# Patient Record
Sex: FEMALE | URBAN - METROPOLITAN AREA
[De-identification: names, ages, dates, MRNs, and addresses within clinical notes are randomized per-mention and may not be internally consistent; named-entity substitution may affect disease eponyms.]

---

## 2024-04-07 ENCOUNTER — HOSPITAL ENCOUNTER (OUTPATIENT)
Facility: HOSPITAL | Age: 39
Setting detail: OBSERVATION
Discharge: LEFT BEFORE DISCHARGE | End: 2024-04-08
Attending: STUDENT IN AN ORGANIZED HEALTH CARE EDUCATION/TRAINING PROGRAM | Admitting: STUDENT IN AN ORGANIZED HEALTH CARE EDUCATION/TRAINING PROGRAM
Payer: COMMERCIAL

## 2024-04-07 ENCOUNTER — HOSPITAL ENCOUNTER (OUTPATIENT)
Facility: HOSPITAL | Age: 39
Setting detail: OBSERVATION
Discharge: LEFT BEFORE DISCHARGE | End: 2024-04-07
Attending: STUDENT IN AN ORGANIZED HEALTH CARE EDUCATION/TRAINING PROGRAM | Admitting: STUDENT IN AN ORGANIZED HEALTH CARE EDUCATION/TRAINING PROGRAM
Payer: COMMERCIAL

## 2024-04-07 ENCOUNTER — ANESTHESIA (OUTPATIENT)
Dept: OBSTETRICS AND GYNECOLOGY | Facility: HOSPITAL | Age: 39
End: 2024-04-07
Payer: COMMERCIAL

## 2024-04-07 ENCOUNTER — ANESTHESIA EVENT (OUTPATIENT)
Dept: OBSTETRICS AND GYNECOLOGY | Facility: HOSPITAL | Age: 39
End: 2024-04-07
Payer: COMMERCIAL

## 2024-04-07 ENCOUNTER — HOSPITAL ENCOUNTER (EMERGENCY)
Facility: HOSPITAL | Age: 39
Discharge: HOME | End: 2024-04-07
Attending: STUDENT IN AN ORGANIZED HEALTH CARE EDUCATION/TRAINING PROGRAM
Payer: COMMERCIAL

## 2024-04-07 VITALS
DIASTOLIC BLOOD PRESSURE: 66 MMHG | RESPIRATION RATE: 19 BRPM | OXYGEN SATURATION: 100 % | BODY MASS INDEX: 19.99 KG/M2 | HEART RATE: 82 BPM | SYSTOLIC BLOOD PRESSURE: 102 MMHG | WEIGHT: 120 LBS | HEIGHT: 65 IN | TEMPERATURE: 98.6 F

## 2024-04-07 VITALS
OXYGEN SATURATION: 97 % | SYSTOLIC BLOOD PRESSURE: 113 MMHG | RESPIRATION RATE: 18 BRPM | DIASTOLIC BLOOD PRESSURE: 66 MMHG | HEART RATE: 92 BPM | TEMPERATURE: 98.2 F

## 2024-04-07 DIAGNOSIS — O03.9 MISCARRIAGE (HHS-HCC): Primary | ICD-10-CM

## 2024-04-07 PROBLEM — O36.4XX0: Status: ACTIVE | Noted: 2024-04-07

## 2024-04-07 LAB
ABO GROUP (TYPE) IN BLOOD: NORMAL
ABO GROUP (TYPE) IN BLOOD: NORMAL
ALBUMIN SERPL-MCNC: 2.8 G/DL (ref 3.5–5)
ALP BLD-CCNC: 83 U/L (ref 35–125)
ALT SERPL-CCNC: 5 U/L (ref 5–40)
AMPHETAMINES UR QL SCN>1000 NG/ML: POSITIVE
ANION GAP SERPL CALC-SCNC: 8 MMOL/L
ANTIBODY SCREEN: NORMAL
AST SERPL-CCNC: 14 U/L (ref 5–40)
BARBITURATES UR QL SCN>300 NG/ML: NEGATIVE
BENZODIAZ UR QL SCN>300 NG/ML: NEGATIVE
BILIRUB SERPL-MCNC: 0.2 MG/DL (ref 0.1–1.2)
BUN SERPL-MCNC: 7 MG/DL (ref 8–25)
BZE UR QL SCN>300 NG/ML: NEGATIVE
CALCIUM SERPL-MCNC: 8.5 MG/DL (ref 8.5–10.4)
CANNABINOIDS UR QL SCN>50 NG/ML: POSITIVE
CHLORIDE SERPL-SCNC: 107 MMOL/L (ref 97–107)
CO2 SERPL-SCNC: 22 MMOL/L (ref 24–31)
CREAT SERPL-MCNC: 0.4 MG/DL (ref 0.4–1.6)
EGFRCR SERPLBLD CKD-EPI 2021: >90 ML/MIN/1.73M*2
ERYTHROCYTE [DISTWIDTH] IN BLOOD BY AUTOMATED COUNT: 12.4 % (ref 11.5–14.5)
FENTANYL+NORFENTANYL UR QL SCN: NEGATIVE
GLUCOSE SERPL-MCNC: 83 MG/DL (ref 65–99)
HCT VFR BLD AUTO: 35.5 % (ref 36–46)
HGB BLD-MCNC: 11.8 G/DL (ref 12–16)
HIV 1+2 AB+HIV1P24 AG SERPLBLD IA.RAPID: NONREACTIVE
MCH RBC QN AUTO: 29.6 PG (ref 26–34)
MCHC RBC AUTO-ENTMCNC: 33.2 G/DL (ref 32–36)
MCV RBC AUTO: 89 FL (ref 80–100)
METHADONE UR QL SCN>300 NG/ML: NEGATIVE
NRBC BLD-RTO: 0 /100 WBCS (ref 0–0)
OPIATES UR QL SCN>300 NG/ML: NEGATIVE
OXYCODONE UR QL: NEGATIVE
PCP UR QL SCN>25 NG/ML: NEGATIVE
PLATELET # BLD AUTO: 332 X10*3/UL (ref 150–450)
POTASSIUM SERPL-SCNC: 4 MMOL/L (ref 3.4–5.1)
PROT SERPL-MCNC: 5.5 G/DL (ref 5.9–7.9)
RBC # BLD AUTO: 3.99 X10*6/UL (ref 4–5.2)
RH FACTOR (ANTIGEN D): NORMAL
RH FACTOR (ANTIGEN D): NORMAL
SODIUM SERPL-SCNC: 137 MMOL/L (ref 133–145)
WBC # BLD AUTO: 18.1 X10*3/UL (ref 4.4–11.3)

## 2024-04-07 PROCEDURE — 3700000018 HC OB ANESTHESIA C-SECTION: Performed by: STUDENT IN AN ORGANIZED HEALTH CARE EDUCATION/TRAINING PROGRAM

## 2024-04-07 PROCEDURE — 80053 COMPREHEN METABOLIC PANEL: CPT | Performed by: STUDENT IN AN ORGANIZED HEALTH CARE EDUCATION/TRAINING PROGRAM

## 2024-04-07 PROCEDURE — 99285 EMERGENCY DEPT VISIT HI MDM: CPT

## 2024-04-07 PROCEDURE — 2500000004 HC RX 250 GENERAL PHARMACY W/ HCPCS (ALT 636 FOR OP/ED): Performed by: NURSE ANESTHETIST, CERTIFIED REGISTERED

## 2024-04-07 PROCEDURE — 2500000002 HC RX 250 W HCPCS SELF ADMINISTERED DRUGS (ALT 637 FOR MEDICARE OP, ALT 636 FOR OP/ED): Performed by: STUDENT IN AN ORGANIZED HEALTH CARE EDUCATION/TRAINING PROGRAM

## 2024-04-07 PROCEDURE — 2500000004 HC RX 250 GENERAL PHARMACY W/ HCPCS (ALT 636 FOR OP/ED): Performed by: STUDENT IN AN ORGANIZED HEALTH CARE EDUCATION/TRAINING PROGRAM

## 2024-04-07 PROCEDURE — G0378 HOSPITAL OBSERVATION PER HR: HCPCS

## 2024-04-07 PROCEDURE — 88307 TISSUE EXAM BY PATHOLOGIST: CPT | Performed by: PATHOLOGY

## 2024-04-07 PROCEDURE — 2500000005 HC RX 250 GENERAL PHARMACY W/O HCPCS: Performed by: NURSE ANESTHETIST, CERTIFIED REGISTERED

## 2024-04-07 PROCEDURE — 1220000001 HC OB SEMI-PRIVATE ROOM DAILY

## 2024-04-07 PROCEDURE — 3700000014 HC AN EPIDURAL BLOCK CHARGE: Performed by: STUDENT IN AN ORGANIZED HEALTH CARE EDUCATION/TRAINING PROGRAM

## 2024-04-07 PROCEDURE — 87340 HEPATITIS B SURFACE AG IA: CPT | Mod: TRILAB,WESLAB | Performed by: STUDENT IN AN ORGANIZED HEALTH CARE EDUCATION/TRAINING PROGRAM

## 2024-04-07 PROCEDURE — S4991 NICOTINE PATCH NONLEGEND: HCPCS | Performed by: STUDENT IN AN ORGANIZED HEALTH CARE EDUCATION/TRAINING PROGRAM

## 2024-04-07 PROCEDURE — 57410 PELVIC EXAMINATION: CPT

## 2024-04-07 PROCEDURE — 7100000016 HC LABOR RECOVERY PER HOUR: Performed by: STUDENT IN AN ORGANIZED HEALTH CARE EDUCATION/TRAINING PROGRAM

## 2024-04-07 PROCEDURE — 85027 COMPLETE CBC AUTOMATED: CPT | Performed by: STUDENT IN AN ORGANIZED HEALTH CARE EDUCATION/TRAINING PROGRAM

## 2024-04-07 PROCEDURE — 86780 TREPONEMA PALLIDUM: CPT | Mod: TRILAB,WESLAB | Performed by: STUDENT IN AN ORGANIZED HEALTH CARE EDUCATION/TRAINING PROGRAM

## 2024-04-07 PROCEDURE — 36415 COLL VENOUS BLD VENIPUNCTURE: CPT | Performed by: STUDENT IN AN ORGANIZED HEALTH CARE EDUCATION/TRAINING PROGRAM

## 2024-04-07 PROCEDURE — 2500000001 HC RX 250 WO HCPCS SELF ADMINISTERED DRUGS (ALT 637 FOR MEDICARE OP): Performed by: STUDENT IN AN ORGANIZED HEALTH CARE EDUCATION/TRAINING PROGRAM

## 2024-04-07 PROCEDURE — 86901 BLOOD TYPING SEROLOGIC RH(D): CPT | Performed by: STUDENT IN AN ORGANIZED HEALTH CARE EDUCATION/TRAINING PROGRAM

## 2024-04-07 PROCEDURE — 86803 HEPATITIS C AB TEST: CPT | Mod: TRILAB,WESLAB | Performed by: STUDENT IN AN ORGANIZED HEALTH CARE EDUCATION/TRAINING PROGRAM

## 2024-04-07 PROCEDURE — 86703 HIV-1/HIV-2 1 RESULT ANTBDY: CPT | Performed by: STUDENT IN AN ORGANIZED HEALTH CARE EDUCATION/TRAINING PROGRAM

## 2024-04-07 PROCEDURE — 80307 DRUG TEST PRSMV CHEM ANLYZR: CPT | Performed by: STUDENT IN AN ORGANIZED HEALTH CARE EDUCATION/TRAINING PROGRAM

## 2024-04-07 PROCEDURE — 59409 OBSTETRICAL CARE: CPT

## 2024-04-07 PROCEDURE — 88307 TISSUE EXAM BY PATHOLOGIST: CPT | Mod: TC,TRILAB,WESLAB | Performed by: STUDENT IN AN ORGANIZED HEALTH CARE EDUCATION/TRAINING PROGRAM

## 2024-04-07 PROCEDURE — 51701 INSERT BLADDER CATHETER: CPT

## 2024-04-07 RX ORDER — ONDANSETRON HYDROCHLORIDE 2 MG/ML
4 INJECTION, SOLUTION INTRAVENOUS EVERY 6 HOURS PRN
Status: DISCONTINUED | OUTPATIENT
Start: 2024-04-07 | End: 2024-04-07 | Stop reason: HOSPADM

## 2024-04-07 RX ORDER — TRANEXAMIC ACID 100 MG/ML
1000 INJECTION, SOLUTION INTRAVENOUS ONCE AS NEEDED
Status: DISCONTINUED | OUTPATIENT
Start: 2024-04-07 | End: 2024-04-08

## 2024-04-07 RX ORDER — ETOMIDATE 2 MG/ML
INJECTION INTRAVENOUS AS NEEDED
Status: DISCONTINUED | OUTPATIENT
Start: 2024-04-07 | End: 2024-04-07

## 2024-04-07 RX ORDER — METOCLOPRAMIDE HYDROCHLORIDE 5 MG/ML
10 INJECTION INTRAMUSCULAR; INTRAVENOUS EVERY 6 HOURS PRN
Status: DISCONTINUED | OUTPATIENT
Start: 2024-04-07 | End: 2024-04-07

## 2024-04-07 RX ORDER — ONDANSETRON 4 MG/1
4 TABLET, FILM COATED ORAL EVERY 6 HOURS PRN
Status: DISCONTINUED | OUTPATIENT
Start: 2024-04-07 | End: 2024-04-08

## 2024-04-07 RX ORDER — CARBOPROST TROMETHAMINE 250 UG/ML
250 INJECTION, SOLUTION INTRAMUSCULAR ONCE AS NEEDED
Status: DISCONTINUED | OUTPATIENT
Start: 2024-04-07 | End: 2024-04-07 | Stop reason: HOSPADM

## 2024-04-07 RX ORDER — MIDAZOLAM HYDROCHLORIDE 1 MG/ML
INJECTION, SOLUTION INTRAMUSCULAR; INTRAVENOUS AS NEEDED
Status: DISCONTINUED | OUTPATIENT
Start: 2024-04-07 | End: 2024-04-07

## 2024-04-07 RX ORDER — OXYTOCIN 10 [USP'U]/ML
10 INJECTION, SOLUTION INTRAMUSCULAR; INTRAVENOUS ONCE AS NEEDED
Status: DISCONTINUED | OUTPATIENT
Start: 2024-04-07 | End: 2024-04-07 | Stop reason: HOSPADM

## 2024-04-07 RX ORDER — SIMETHICONE 80 MG
80 TABLET,CHEWABLE ORAL 4 TIMES DAILY PRN
Status: DISCONTINUED | OUTPATIENT
Start: 2024-04-07 | End: 2024-04-07 | Stop reason: HOSPADM

## 2024-04-07 RX ORDER — MISOPROSTOL 200 UG/1
800 TABLET ORAL ONCE AS NEEDED
Status: DISCONTINUED | OUTPATIENT
Start: 2024-04-07 | End: 2024-04-07

## 2024-04-07 RX ORDER — LABETALOL HYDROCHLORIDE 5 MG/ML
20 INJECTION, SOLUTION INTRAVENOUS ONCE AS NEEDED
Status: DISCONTINUED | OUTPATIENT
Start: 2024-04-07 | End: 2024-04-07

## 2024-04-07 RX ORDER — DIPHENHYDRAMINE HCL 25 MG
25 TABLET ORAL EVERY 6 HOURS PRN
Status: DISCONTINUED | OUTPATIENT
Start: 2024-04-07 | End: 2024-04-07 | Stop reason: HOSPADM

## 2024-04-07 RX ORDER — DIPHENHYDRAMINE HYDROCHLORIDE 50 MG/ML
25 INJECTION INTRAMUSCULAR; INTRAVENOUS EVERY 6 HOURS PRN
Status: DISCONTINUED | OUTPATIENT
Start: 2024-04-07 | End: 2024-04-08 | Stop reason: HOSPADM

## 2024-04-07 RX ORDER — ONDANSETRON 4 MG/1
4 TABLET, FILM COATED ORAL EVERY 6 HOURS PRN
Status: DISCONTINUED | OUTPATIENT
Start: 2024-04-07 | End: 2024-04-07 | Stop reason: HOSPADM

## 2024-04-07 RX ORDER — METHYLERGONOVINE MALEATE 0.2 MG/ML
0.2 INJECTION INTRAVENOUS ONCE AS NEEDED
Status: DISCONTINUED | OUTPATIENT
Start: 2024-04-07 | End: 2024-04-07

## 2024-04-07 RX ORDER — MISOPROSTOL 200 UG/1
800 TABLET ORAL ONCE AS NEEDED
Status: DISCONTINUED | OUTPATIENT
Start: 2024-04-07 | End: 2024-04-07 | Stop reason: HOSPADM

## 2024-04-07 RX ORDER — METHYLERGONOVINE MALEATE 0.2 MG/ML
0.2 INJECTION INTRAVENOUS ONCE AS NEEDED
Status: DISCONTINUED | OUTPATIENT
Start: 2024-04-07 | End: 2024-04-07 | Stop reason: HOSPADM

## 2024-04-07 RX ORDER — TRANEXAMIC ACID 100 MG/ML
1000 INJECTION, SOLUTION INTRAVENOUS ONCE AS NEEDED
Status: DISCONTINUED | OUTPATIENT
Start: 2024-04-07 | End: 2024-04-07

## 2024-04-07 RX ORDER — ONDANSETRON HYDROCHLORIDE 2 MG/ML
4 INJECTION, SOLUTION INTRAVENOUS EVERY 6 HOURS PRN
Status: DISCONTINUED | OUTPATIENT
Start: 2024-04-07 | End: 2024-04-07

## 2024-04-07 RX ORDER — ADHESIVE BANDAGE
10 BANDAGE TOPICAL
Status: DISCONTINUED | OUTPATIENT
Start: 2024-04-07 | End: 2024-04-08

## 2024-04-07 RX ORDER — OXYTOCIN 10 [USP'U]/ML
10 INJECTION, SOLUTION INTRAMUSCULAR; INTRAVENOUS ONCE AS NEEDED
Status: DISCONTINUED | OUTPATIENT
Start: 2024-04-07 | End: 2024-04-07

## 2024-04-07 RX ORDER — POLYETHYLENE GLYCOL 3350 17 G/17G
17 POWDER, FOR SOLUTION ORAL 2 TIMES DAILY PRN
Status: DISCONTINUED | OUTPATIENT
Start: 2024-04-07 | End: 2024-04-08

## 2024-04-07 RX ORDER — HYDRALAZINE HYDROCHLORIDE 20 MG/ML
5 INJECTION INTRAMUSCULAR; INTRAVENOUS ONCE AS NEEDED
Status: DISCONTINUED | OUTPATIENT
Start: 2024-04-07 | End: 2024-04-07 | Stop reason: HOSPADM

## 2024-04-07 RX ORDER — LABETALOL HYDROCHLORIDE 5 MG/ML
20 INJECTION, SOLUTION INTRAVENOUS ONCE AS NEEDED
Status: DISCONTINUED | OUTPATIENT
Start: 2024-04-07 | End: 2024-04-07 | Stop reason: HOSPADM

## 2024-04-07 RX ORDER — CEFAZOLIN 1 G/1
INJECTION, POWDER, FOR SOLUTION INTRAVENOUS AS NEEDED
Status: DISCONTINUED | OUTPATIENT
Start: 2024-04-07 | End: 2024-04-07

## 2024-04-07 RX ORDER — CARBOPROST TROMETHAMINE 250 UG/ML
250 INJECTION, SOLUTION INTRAMUSCULAR ONCE AS NEEDED
Status: DISCONTINUED | OUTPATIENT
Start: 2024-04-07 | End: 2024-04-07

## 2024-04-07 RX ORDER — HYDRALAZINE HYDROCHLORIDE 20 MG/ML
5 INJECTION INTRAMUSCULAR; INTRAVENOUS ONCE AS NEEDED
Status: DISCONTINUED | OUTPATIENT
Start: 2024-04-07 | End: 2024-04-08

## 2024-04-07 RX ORDER — LABETALOL HYDROCHLORIDE 5 MG/ML
20 INJECTION, SOLUTION INTRAVENOUS ONCE AS NEEDED
Status: DISCONTINUED | OUTPATIENT
Start: 2024-04-07 | End: 2024-04-08

## 2024-04-07 RX ORDER — LIDOCAINE 560 MG/1
1 PATCH PERCUTANEOUS; TOPICAL; TRANSDERMAL
Status: DISCONTINUED | OUTPATIENT
Start: 2024-04-07 | End: 2024-04-08 | Stop reason: HOSPADM

## 2024-04-07 RX ORDER — LOPERAMIDE HYDROCHLORIDE 2 MG/1
4 CAPSULE ORAL EVERY 2 HOUR PRN
Status: DISCONTINUED | OUTPATIENT
Start: 2024-04-07 | End: 2024-04-08

## 2024-04-07 RX ORDER — IBUPROFEN 600 MG/1
600 TABLET ORAL EVERY 6 HOURS
Status: DISCONTINUED | OUTPATIENT
Start: 2024-04-08 | End: 2024-04-08 | Stop reason: HOSPADM

## 2024-04-07 RX ORDER — ACETAMINOPHEN 325 MG/1
975 TABLET ORAL EVERY 6 HOURS
Status: DISCONTINUED | OUTPATIENT
Start: 2024-04-08 | End: 2024-04-08 | Stop reason: HOSPADM

## 2024-04-07 RX ORDER — IBUPROFEN 200 MG
1 TABLET ORAL DAILY
Status: DISCONTINUED | OUTPATIENT
Start: 2024-04-07 | End: 2024-04-07 | Stop reason: HOSPADM

## 2024-04-07 RX ORDER — DIPHENHYDRAMINE HYDROCHLORIDE 50 MG/ML
25 INJECTION INTRAMUSCULAR; INTRAVENOUS EVERY 6 HOURS PRN
Status: DISCONTINUED | OUTPATIENT
Start: 2024-04-07 | End: 2024-04-07 | Stop reason: HOSPADM

## 2024-04-07 RX ORDER — OXYTOCIN 10 [USP'U]/ML
10 INJECTION, SOLUTION INTRAMUSCULAR; INTRAVENOUS ONCE AS NEEDED
Status: DISCONTINUED | OUTPATIENT
Start: 2024-04-07 | End: 2024-04-08

## 2024-04-07 RX ORDER — ONDANSETRON 4 MG/1
4 TABLET, FILM COATED ORAL EVERY 6 HOURS PRN
Status: DISCONTINUED | OUTPATIENT
Start: 2024-04-07 | End: 2024-04-07

## 2024-04-07 RX ORDER — ADHESIVE BANDAGE
10 BANDAGE TOPICAL
Status: DISCONTINUED | OUTPATIENT
Start: 2024-04-07 | End: 2024-04-07 | Stop reason: HOSPADM

## 2024-04-07 RX ORDER — KETOROLAC TROMETHAMINE 30 MG/ML
INJECTION, SOLUTION INTRAMUSCULAR; INTRAVENOUS AS NEEDED
Status: DISCONTINUED | OUTPATIENT
Start: 2024-04-07 | End: 2024-04-07

## 2024-04-07 RX ORDER — ONDANSETRON HYDROCHLORIDE 2 MG/ML
4 INJECTION, SOLUTION INTRAVENOUS EVERY 6 HOURS PRN
Status: DISCONTINUED | OUTPATIENT
Start: 2024-04-07 | End: 2024-04-08

## 2024-04-07 RX ORDER — SODIUM CHLORIDE, SODIUM LACTATE, POTASSIUM CHLORIDE, CALCIUM CHLORIDE 600; 310; 30; 20 MG/100ML; MG/100ML; MG/100ML; MG/100ML
125 INJECTION, SOLUTION INTRAVENOUS CONTINUOUS
Status: DISCONTINUED | OUTPATIENT
Start: 2024-04-07 | End: 2024-04-07

## 2024-04-07 RX ORDER — METHYLERGONOVINE MALEATE 0.2 MG/ML
0.2 INJECTION INTRAVENOUS ONCE AS NEEDED
Status: DISCONTINUED | OUTPATIENT
Start: 2024-04-07 | End: 2024-04-08

## 2024-04-07 RX ORDER — LIDOCAINE 560 MG/1
1 PATCH PERCUTANEOUS; TOPICAL; TRANSDERMAL
Status: DISCONTINUED | OUTPATIENT
Start: 2024-04-07 | End: 2024-04-07 | Stop reason: HOSPADM

## 2024-04-07 RX ORDER — LIDOCAINE HYDROCHLORIDE 10 MG/ML
30 INJECTION INFILTRATION; PERINEURAL ONCE AS NEEDED
Status: DISCONTINUED | OUTPATIENT
Start: 2024-04-07 | End: 2024-04-07

## 2024-04-07 RX ORDER — LOPERAMIDE HYDROCHLORIDE 2 MG/1
4 CAPSULE ORAL EVERY 2 HOUR PRN
Status: DISCONTINUED | OUTPATIENT
Start: 2024-04-07 | End: 2024-04-07 | Stop reason: HOSPADM

## 2024-04-07 RX ORDER — SUCCINYLCHOLINE CHLORIDE 100 MG/5ML
SYRINGE (ML) INTRAVENOUS AS NEEDED
Status: DISCONTINUED | OUTPATIENT
Start: 2024-04-07 | End: 2024-04-07

## 2024-04-07 RX ORDER — POLYETHYLENE GLYCOL 3350 17 G/17G
17 POWDER, FOR SOLUTION ORAL 2 TIMES DAILY PRN
Status: DISCONTINUED | OUTPATIENT
Start: 2024-04-07 | End: 2024-04-07 | Stop reason: HOSPADM

## 2024-04-07 RX ORDER — METOCLOPRAMIDE 10 MG/1
10 TABLET ORAL EVERY 6 HOURS PRN
Status: DISCONTINUED | OUTPATIENT
Start: 2024-04-07 | End: 2024-04-07

## 2024-04-07 RX ORDER — BISACODYL 10 MG/1
10 SUPPOSITORY RECTAL DAILY PRN
Status: DISCONTINUED | OUTPATIENT
Start: 2024-04-07 | End: 2024-04-08

## 2024-04-07 RX ORDER — FENTANYL CITRATE 50 UG/ML
INJECTION, SOLUTION INTRAMUSCULAR; INTRAVENOUS AS NEEDED
Status: DISCONTINUED | OUTPATIENT
Start: 2024-04-07 | End: 2024-04-07

## 2024-04-07 RX ORDER — LOPERAMIDE HYDROCHLORIDE 2 MG/1
4 CAPSULE ORAL EVERY 2 HOUR PRN
Status: DISCONTINUED | OUTPATIENT
Start: 2024-04-07 | End: 2024-04-07

## 2024-04-07 RX ORDER — DIPHENHYDRAMINE HCL 25 MG
25 TABLET ORAL EVERY 6 HOURS PRN
Status: DISCONTINUED | OUTPATIENT
Start: 2024-04-07 | End: 2024-04-08 | Stop reason: HOSPADM

## 2024-04-07 RX ORDER — HYDRALAZINE HYDROCHLORIDE 20 MG/ML
5 INJECTION INTRAMUSCULAR; INTRAVENOUS ONCE AS NEEDED
Status: DISCONTINUED | OUTPATIENT
Start: 2024-04-07 | End: 2024-04-07

## 2024-04-07 RX ORDER — CARBOPROST TROMETHAMINE 250 UG/ML
250 INJECTION, SOLUTION INTRAMUSCULAR ONCE AS NEEDED
Status: DISCONTINUED | OUTPATIENT
Start: 2024-04-07 | End: 2024-04-08

## 2024-04-07 RX ORDER — IBUPROFEN 600 MG/1
600 TABLET ORAL EVERY 6 HOURS
Status: DISCONTINUED | OUTPATIENT
Start: 2024-04-07 | End: 2024-04-07 | Stop reason: HOSPADM

## 2024-04-07 RX ORDER — ACETAMINOPHEN 325 MG/1
975 TABLET ORAL EVERY 6 HOURS
Status: DISCONTINUED | OUTPATIENT
Start: 2024-04-07 | End: 2024-04-07 | Stop reason: HOSPADM

## 2024-04-07 RX ORDER — NIFEDIPINE 10 MG/1
10 CAPSULE ORAL ONCE AS NEEDED
Status: DISCONTINUED | OUTPATIENT
Start: 2024-04-07 | End: 2024-04-08

## 2024-04-07 RX ORDER — NIFEDIPINE 10 MG/1
10 CAPSULE ORAL ONCE AS NEEDED
Status: DISCONTINUED | OUTPATIENT
Start: 2024-04-07 | End: 2024-04-07

## 2024-04-07 RX ORDER — IBUPROFEN 200 MG
1 TABLET ORAL DAILY
Status: DISCONTINUED | OUTPATIENT
Start: 2024-04-09 | End: 2024-04-08 | Stop reason: HOSPADM

## 2024-04-07 RX ORDER — NIFEDIPINE 10 MG/1
10 CAPSULE ORAL ONCE AS NEEDED
Status: DISCONTINUED | OUTPATIENT
Start: 2024-04-07 | End: 2024-04-07 | Stop reason: HOSPADM

## 2024-04-07 RX ORDER — OXYTOCIN/0.9 % SODIUM CHLORIDE 30/500 ML
60 PLASTIC BAG, INJECTION (ML) INTRAVENOUS ONCE AS NEEDED
Status: DISCONTINUED | OUTPATIENT
Start: 2024-04-07 | End: 2024-04-08 | Stop reason: HOSPADM

## 2024-04-07 RX ORDER — SIMETHICONE 80 MG
80 TABLET,CHEWABLE ORAL 4 TIMES DAILY PRN
Status: DISCONTINUED | OUTPATIENT
Start: 2024-04-07 | End: 2024-04-08

## 2024-04-07 RX ORDER — OXYTOCIN/0.9 % SODIUM CHLORIDE 30/500 ML
60 PLASTIC BAG, INJECTION (ML) INTRAVENOUS ONCE AS NEEDED
Status: COMPLETED | OUTPATIENT
Start: 2024-04-07 | End: 2024-04-07

## 2024-04-07 RX ORDER — BISACODYL 10 MG/1
10 SUPPOSITORY RECTAL DAILY PRN
Status: DISCONTINUED | OUTPATIENT
Start: 2024-04-07 | End: 2024-04-07 | Stop reason: HOSPADM

## 2024-04-07 RX ORDER — MISOPROSTOL 200 UG/1
800 TABLET ORAL ONCE AS NEEDED
Status: DISCONTINUED | OUTPATIENT
Start: 2024-04-07 | End: 2024-04-08

## 2024-04-07 RX ORDER — OXYTOCIN/0.9 % SODIUM CHLORIDE 30/500 ML
60 PLASTIC BAG, INJECTION (ML) INTRAVENOUS ONCE AS NEEDED
Status: DISCONTINUED | OUTPATIENT
Start: 2024-04-07 | End: 2024-04-07 | Stop reason: HOSPADM

## 2024-04-07 RX ORDER — TRANEXAMIC ACID 100 MG/ML
1000 INJECTION, SOLUTION INTRAVENOUS ONCE AS NEEDED
Status: DISCONTINUED | OUTPATIENT
Start: 2024-04-07 | End: 2024-04-07 | Stop reason: HOSPADM

## 2024-04-07 RX ADMIN — SODIUM CHLORIDE, POTASSIUM CHLORIDE, SODIUM LACTATE AND CALCIUM CHLORIDE: 600; 310; 30; 20 INJECTION, SOLUTION INTRAVENOUS at 18:02

## 2024-04-07 RX ADMIN — Medication 60 MILLI-UNITS/MIN: at 17:44

## 2024-04-07 RX ADMIN — MIDAZOLAM 1 MG: 1 INJECTION INTRAMUSCULAR; INTRAVENOUS at 18:04

## 2024-04-07 RX ADMIN — ETOMIDATE 200 MG: 2 INJECTION, SOLUTION INTRAVENOUS at 17:37

## 2024-04-07 RX ADMIN — SODIUM CHLORIDE, POTASSIUM CHLORIDE, SODIUM LACTATE AND CALCIUM CHLORIDE: 600; 310; 30; 20 INJECTION, SOLUTION INTRAVENOUS at 17:30

## 2024-04-07 RX ADMIN — CEFAZOLIN 2 G: 330 INJECTION, POWDER, FOR SOLUTION INTRAMUSCULAR; INTRAVENOUS at 17:45

## 2024-04-07 RX ADMIN — FENTANYL CITRATE 50 MCG: 0.05 INJECTION, SOLUTION INTRAMUSCULAR; INTRAVENOUS at 17:38

## 2024-04-07 RX ADMIN — NICOTINE 1 PATCH: 14 PATCH, EXTENDED RELEASE TRANSDERMAL at 21:12

## 2024-04-07 RX ADMIN — ETOMIDATE 100 MG: 2 INJECTION, SOLUTION INTRAVENOUS at 17:45

## 2024-04-07 RX ADMIN — ONDANSETRON HYDROCHLORIDE 4 MG: 2 INJECTION INTRAMUSCULAR; INTRAVENOUS at 18:10

## 2024-04-07 RX ADMIN — FENTANYL CITRATE 50 MCG: 0.05 INJECTION, SOLUTION INTRAMUSCULAR; INTRAVENOUS at 18:11

## 2024-04-07 RX ADMIN — IBUPROFEN 600 MG: 600 TABLET, FILM COATED ORAL at 20:02

## 2024-04-07 RX ADMIN — KETOROLAC TROMETHAMINE 30 MG: 30 INJECTION, SOLUTION INTRAMUSCULAR at 18:10

## 2024-04-07 RX ADMIN — Medication 100 MG: at 17:37

## 2024-04-07 RX ADMIN — ACETAMINOPHEN 975 MG: 325 TABLET ORAL at 20:02

## 2024-04-07 RX ADMIN — DEXAMETHASONE SODIUM PHOSPHATE 4 MG: 4 INJECTION, SOLUTION INTRAMUSCULAR; INTRAVENOUS at 18:10

## 2024-04-07 SDOH — HEALTH STABILITY: MENTAL HEALTH: HAVE YOU USED ANY PRESCRIPTION DRUGS OTHER THAN PRESCRIBED IN THE PAST 12 MONTHS?: YES

## 2024-04-07 SDOH — HEALTH STABILITY: MENTAL HEALTH: CURRENT SMOKER: 1

## 2024-04-07 SDOH — SOCIAL STABILITY: SOCIAL INSECURITY: VERBAL ABUSE: YES, PRESENT (COMMENT)

## 2024-04-07 SDOH — ECONOMIC STABILITY: HOUSING INSECURITY: DO YOU FEEL UNSAFE GOING BACK TO THE PLACE WHERE YOU ARE LIVING?: YES

## 2024-04-07 SDOH — HEALTH STABILITY: MENTAL HEALTH: HAVE YOU USED ANY SUBSTANCES (CANABIS, COCAINE, HEROIN, HALLUCINOGENS, INHALANTS, ETC.) IN THE PAST 12 MONTHS?: YES

## 2024-04-07 SDOH — SOCIAL STABILITY: SOCIAL INSECURITY: ABUSE SCREEN: ADULT

## 2024-04-07 SDOH — HEALTH STABILITY: MENTAL HEALTH: SUICIDAL BEHAVIOR (LIFETIME): NO

## 2024-04-07 SDOH — HEALTH STABILITY: MENTAL HEALTH: WERE YOU ABLE TO COMPLETE ALL THE BEHAVIORAL HEALTH SCREENINGS?: YES

## 2024-04-07 SDOH — HEALTH STABILITY: MENTAL HEALTH: ACTIVE SUICIDAL IDEATION WITH SOME INTENT TO ACT, WITHOUT SPECIFIC PLAN (PAST 1 MONTH): NO

## 2024-04-07 SDOH — SOCIAL STABILITY: SOCIAL INSECURITY: ARE YOU OR HAVE YOU BEEN THREATENED OR ABUSED PHYSICALLY, EMOTIONALLY, OR SEXUALLY BY ANYONE?: YES

## 2024-04-07 SDOH — HEALTH STABILITY: MENTAL HEALTH: EXPERIENCED ANY OF THE FOLLOWING LIFE EVENTS: OTHER (COMMENT)

## 2024-04-07 SDOH — SOCIAL STABILITY: SOCIAL INSECURITY: ARE THERE ANY APPARENT SIGNS OF INJURIES/BEHAVIORS THAT COULD BE RELATED TO ABUSE/NEGLECT?: YES

## 2024-04-07 SDOH — HEALTH STABILITY: MENTAL HEALTH: NON-SPECIFIC ACTIVE SUICIDAL THOUGHTS (PAST 1 MONTH): YES

## 2024-04-07 SDOH — HEALTH STABILITY: MENTAL HEALTH: ACTIVE SUICIDAL IDEATION WITH SPECIFIC PLAN AND INTENT (PAST 1 MONTH): NO

## 2024-04-07 SDOH — SOCIAL STABILITY: SOCIAL INSECURITY: DO YOU FEEL ANYONE HAS EXPLOITED OR TAKEN ADVANTAGE OF YOU FINANCIALLY OR OF YOUR PERSONAL PROPERTY?: YES

## 2024-04-07 SDOH — SOCIAL STABILITY: SOCIAL INSECURITY: PHYSICAL ABUSE: YES, PRESENT (COMMENT)

## 2024-04-07 SDOH — HEALTH STABILITY: MENTAL HEALTH: WISH TO BE DEAD (PAST 1 MONTH): NO

## 2024-04-07 ASSESSMENT — ACTIVITIES OF DAILY LIVING (ADL)
PATIENT'S MEMORY ADEQUATE TO SAFELY COMPLETE DAILY ACTIVITIES?: YES
TOILETING: INDEPENDENT
GROOMING: INDEPENDENT
ADEQUATE_TO_COMPLETE_ADL: YES
BATHING: INDEPENDENT
JUDGMENT_ADEQUATE_SAFELY_COMPLETE_DAILY_ACTIVITIES: YES
ASSISTIVE_DEVICE: DENTURES UPPER
DRESSING YOURSELF: INDEPENDENT
LACK_OF_TRANSPORTATION: YES
WALKS IN HOME: INDEPENDENT
HEARING - LEFT EAR: FUNCTIONAL
FEEDING YOURSELF: INDEPENDENT
HEARING - RIGHT EAR: FUNCTIONAL

## 2024-04-07 ASSESSMENT — PAIN - FUNCTIONAL ASSESSMENT: PAIN_FUNCTIONAL_ASSESSMENT: 0-10

## 2024-04-07 ASSESSMENT — PAIN DESCRIPTION - PAIN TYPE: TYPE: ACUTE PAIN

## 2024-04-07 ASSESSMENT — PAIN SCALES - GENERAL
PAINLEVEL_OUTOF10: 7
PAINLEVEL_OUTOF10: 0 - NO PAIN
PAINLEVEL_OUTOF10: 0 - NO PAIN
PAINLEVEL_OUTOF10: 5 - MODERATE PAIN
PAIN_LEVEL: 3
PAINLEVEL_OUTOF10: 0 - NO PAIN
PAINLEVEL_OUTOF10: 0 - NO PAIN

## 2024-04-07 ASSESSMENT — COLUMBIA-SUICIDE SEVERITY RATING SCALE - C-SSRS
6. HAVE YOU EVER DONE ANYTHING, STARTED TO DO ANYTHING, OR PREPARED TO DO ANYTHING TO END YOUR LIFE?: NO
2. HAVE YOU ACTUALLY HAD ANY THOUGHTS OF KILLING YOURSELF?: NO
1. IN THE PAST MONTH, HAVE YOU WISHED YOU WERE DEAD OR WISHED YOU COULD GO TO SLEEP AND NOT WAKE UP?: NO

## 2024-04-07 ASSESSMENT — PAIN DESCRIPTION - LOCATION: LOCATION: ABDOMEN

## 2024-04-07 NOTE — ANESTHESIA PREPROCEDURE EVALUATION
Patient: Darcy Godoy    Evaluation Method: In-person visit    Procedure Information    Anesthesia Start Date/Time: 04/07/24 1739  Procedure: Procedure Not Yet Scheduled         Relevant Problems   No relevant active problems       Clinical information reviewed:    Allergies  Meds               NPO Detail:  NPO/Void Status  Date of Last Liquid: 04/06/24  Time of Last Liquid: 2100  Date of Last Solid: 04/06/24  Time of Last Solid: 2100         OB/GYN     Physical Exam    Airway  Mallampati: I  TM distance: >3 FB     Cardiovascular   Rhythm: regular     Dental   (+) upper dentures     Pulmonary   Breath sounds clear to auscultation     Abdominal        38 year old cachectic female delivered fetal demise in the squad.  Refuse s exam, admits to methamphetamine use and former heroin use.Anesthesia Plan    History of general anesthesia?: yes  History of complications of general anesthesia?: no    ASA 3 - emergent     general     The patient is a current smoker.  Patient was not previously instructed to abstain from smoking on day of procedure.  Patient smoked on day of procedure.    Anesthetic plan and risks discussed with patient.    Plan discussed with CRNA and attending.

## 2024-04-07 NOTE — PROGRESS NOTES
Patient arrived to unit from ED for fetal demise. 22G IV saline locked in RFA. Small amount of bright red vaginal bleeding noted on ashley pad. Pt complaining of intense uterine cramps. ED RN states that pt delivered in squad while en route to the hospital. Pt has had no PNC during this pregnancy and gestational age is unknown. Fetus arrived to unit alongside patient in a plastic bucket. Fetus remains en caul with no signs of life at this time. Artifical rupture of membranes and assessment done by Dr. Shelton. Estimated to be 24-25 weeks gestation based on MD's assessment. Amniotic fluid noted to be light tan in color and odorous. Cord clamped and cut by Dr. Shelton. 3 vessel cord. Male fetus intact and no significant deformities to note but there is questionable thickened nuchal fold and low set ears. Placenta seems to be intact. Placenta sent to pathology, patient desires to see and hold fetus at this time and will determine her plans for disposition.

## 2024-04-07 NOTE — ED PROVIDER NOTES
Supervisory note:  Patient seen in conjunction with PUJA Gregg.  Patient presents after apparent miscarriage.  Patient reports that she had taken a positive home pregnancy test in December and she had subsequently taken another pregnancy test following this which was inconclusive.  She assumed that she was not pregnant.  She states that she has been having bleeding since then.  Over the last approximately 3 weeks, she has been having progressive abdominal cramping.  She reports that her breasts became slightly hard and engorged.  Over the last several days, she feels as though her abdomen became larger and she has an umbilical hernia which she reports is protruding.  She states that she has had 4 previous pregnancies, 3 live births and 1 miscarriage. She is unsure when her last menstrual period was, stating that she last saw her gynecologist in August and was started on birth control at that time. On examination, the lower abdomen is diffusely tender to palpation.  Examination of fetal tissue reveals fetus which is inside gestational sac. Fetus not felt to be viable. There is placental tissue attached to gestational sac.  Patient's case was discussed with obstetrics, Dr. Shelton, who agrees to further assess patient on labor and delivery unit.  I personally saw the patient and made/approved the management plan and take responsiblity for the patient management.  Parts of this chart were completed with dictation software, please excuse any errors in transcription.     Simón Lundy MD  04/07/24 9914       Simón Lundy MD  04/07/24 3506

## 2024-04-07 NOTE — ANESTHESIA POSTPROCEDURE EVALUATION
Patient: Darcy Godoy    Procedure Summary       Date: 04/07/24 Room / Location:     Anesthesia Start: 1739 Anesthesia Stop: 1835    Procedure: Procedure Not Yet Scheduled Diagnosis:     Scheduled Providers:  Responsible Provider: KRISTY Menjivar-EAGLE, PAULO    Anesthesia Type: general ASA Status: 3 - Emergent            Anesthesia Type: general    Vitals Value Taken Time   BP 95/54 04/07/24 1832   Temp 98 04/07/24 1836   Pulse 60 04/07/24 1832   Resp 16 04/07/24 1832   SpO2 100 % 04/07/24 1832       Anesthesia Post Evaluation    Patient location during evaluation: bedside  Patient participation: complete - patient participated  Level of consciousness: agitated and awake and alert  Pain score: 3  Pain management: adequate  Airway patency: patent  Cardiovascular status: acceptable  Respiratory status: acceptable  Hydration status: acceptable  Postoperative Nausea and Vomiting: none        There were no known notable events for this encounter.

## 2024-04-07 NOTE — OP NOTE
Date: 2024  [place] [facility]    Name: Darcy Godoy, : 1985, Age: 38 y.o., MRN: [id number], Sex: female    Diagnosis   delivery of fetal demise  No prenatal care  Postpartum bleeding    Procedures  Pelvic examination under anesthesia    Surgeons      * Mary Shelton - Primary    Procedure Summary  Anesthesia: General  ASA: ASA status not filed in the log.  Anesthesia Staff: CRNA: Corina Garcia, KRISTY-CRNA, PAULO  Estimated Blood Loss: 200mL    Specimen: No specimens collected     Staff:   Scrub Person: Monserrat Ramirez RN    Indication and Consent:  38 year old female presented via EMS after precipitous delivery of fetal demise. She was not able to tolerate any postpartum evaluation, so recommendation was made to proceed to the OR for examination under anesthesia. Patient verbally agreed to proceed. The patient declined IM pitocin, but was agreeable to IV pitocin so this was initiated. While prepping for the OR, the patient proceeded to pass a large amount of unquantified blood in the toilet, prompting urgent transfer to the OR prior to written consent being obtained. Patient again verbally agreed to proceed.      Procedure Details:  The patient was taken to the operating room where anesthesia was obtained. She was placed in dorsal lithotomy position. She was prepped and draped in normal sterile fashion.  Bladder was drained via straight catheterization. Survey of the  tract reveal no lacerations. Cervix was visualized with weighted speculum and a Rosenthal retractor and appeared normal. A bimanual examination was performed with evacuation of approximately 200cc of clot from the lower uterine segment. Fundal massage was performed. Bedside US confirmed a thin homogenous endometrial stripe. The patient's bleeding was scant and her fundus was firm at 4cm below the umbilicus. The procedure was deemed complete.  All instruments were removed from the vagina.  The patient tolerated the procedure  well.  All counts were correct.  The patient was taken to the recovery room in satisfactory stable condition.      Complications:  None; patient tolerated the procedure well.     Disposition: PACU - hemodynamically stable.  Condition: stable

## 2024-04-07 NOTE — ED NOTES
Pt transferred to OB to room AP 2. MD Lundy consulted OB and OB stated that she can go upstairs. Pt assessed in ED and complaining of dizziness, lightheadedness and cramping. IV placed in pt (22 L FA) prior to transfer.      Sammie Orellana RN  04/07/24 0684

## 2024-04-07 NOTE — H&P
Obstetrical Admission History and Physical     Darcy Rule is a 38 y.o.  at unknown gestational age. No prenatal care.    Chief Complaint: precipitous delivery of IUFD    Assessment/Plan    Patient is hemodynamically stable but is unable to tolerate any kind of physical examination. We discussed examination under anesthesia with possible d&c and/or repair of any obstetrical lacerations. Risks, benefits, and alternatives discussed. Patient wishes to proceed. Admit to L&D for observation and labwork. Plan UDS and PNL. Unasyn for possible chorioamnionitis.     Principal Problem:    Fetal demise in locke pregnancy greater than 22 weeks gestation, antepartum      Pregnancy Problems (from 24 to present)       Problem Noted Resolved    Fetal demise in locke pregnancy greater than 22 weeks gestation, antepartum 2024 by Mary Shelton MD No    Priority:  Medium            Subjective   Darcy presents via EMS after new onset abdominal pain. She proceeded to spontaneously deliver in the squad.     Patient reports a positive pregnancy test in 2023. She has a history of substance abuse and has been homeless intermittently since that time. She reports polysubstance abuse; has used heroin in the past, more recently using methPatient reports some vaginal bleeding that started a few days ago, she assumed her menses. When her pain worsened today she call EMS and delivered her pregnancy en caul.     Upon patient's arrival to L&D, the delivered pregnancy was examined. Membranes were artificially ruptured with malodorous fluid noted. A second trimester male fetus with no signs of life was noted. Placenta appeared intact.     Obstetrical History   OB History    Para Term  AB Living   5             SAB IAB Ectopic Multiple Live Births                  # Outcome Date GA Lbr Sukumar/2nd Weight Sex Delivery Anes PTL Lv   5 Current            4             3             2              1                 Past Medical History  No past medical history on file.     Past Surgical History   Past Surgical History:   Procedure Laterality Date    DILATION AND CURETTAGE OF UTERUS         Social History  Social History     Tobacco Use    Smoking status: Not on file    Smokeless tobacco: Not on file   Substance Use Topics    Alcohol use: Not on file     Substance and Sexual Activity   Drug Use Not on file       Allergies  Sulfa (sulfonamide antibiotics)     Medications  No medications prior to admission.       Objective    Last Vitals  Temp Pulse Resp BP MAP O2 Sat   36.6 °C (97.9 °F) 83 18 124/74   (!) 87 % (pulseox replaced)     Physical Examination  AAOx3, no acute distress, cachetic  RRR, CTAB  Abd soft, patient declines any further examination or fundal check, pushes examining hand away  Normal appearing external female genitalia; small amount of old blood present, no active free flow. Patient does not tolerate any  examination.    Lab Review  All labwork pending

## 2024-04-07 NOTE — ANESTHESIA PROCEDURE NOTES
Airway  Date/Time: 4/7/2024 5:39 PM  Urgency: emergent    Airway not difficult    Staffing  Performed: CRNA   Authorized by: RALPH Menjivar DNP    Performed by: RALPH Menjivar DNP  Patient location during procedure: OR    Indications and Patient Condition  Indications for airway management: anesthesia and airway protection  Spontaneous ventilation: present  Sedation level: deep  Preoxygenated: yes  Patient position: sniffing  MILS not maintained throughout  Mask difficulty assessment: 0 - not attempted  No planned trial extubation    Final Airway Details  Final airway type: endotracheal airway      Successful airway: ETT  Cuffed: yes   Successful intubation technique: video laryngoscopy  Endotracheal tube insertion site: oral  Blade: Mark  Blade size: #3  ETT size (mm): 6.5  Cormack-Lehane Classification: grade I - full view of glottis  Placement verified by: chest auscultation   Measured from: gums  Number of attempts at approach: 1

## 2024-04-07 NOTE — ED PROVIDER NOTES
HPI   Chief Complaint   Patient presents with    Miscarriage     Pt came in via community care for abdominal pain. Pt tested positive for pregnancy in December. Pt had miscarriage in community care squad-fetus not viable for life. Concerned placenta is not fully intact. Pt is complaining of dizziness lightheadedness and cramping.       HPI     Patient is a 38-year-old female presenting after apparent miscarriage.  Patient states she took a pregnancy back in December but that was positive.  However she took another pregnancy test following and stated it was incomplete conclusive.  Patient assumed that she was not pregnant, however she states she has been having intermittent bleeding since then.  Over the past 3 weeks, patient has been having worsening abdominal cramping.  Over the last several days, patient feels as if her abdomen was larger.  Patient has a history of 4 previous pregnancies.  3 live births 1 miscarriage.  She denies fevers or chills.  No chest pain or shortness of breath.  States she has been homeless over the past several months and does not follow with OB/GYN.               Jess[place] Coma Scale Score: 15                     Patient History   No past medical history on file.  No past surgical history on file.  No family history on file.  Social History     Tobacco Use    Smoking status: Not on file    Smokeless tobacco: Not on file   Substance Use Topics    Alcohol use: Not on file    Drug use: Not on file       Physical Exam   ED Triage Vitals [04/07/24 1602]   Temperature Heart Rate Respirations BP   37 °C (98.6 °F) 82 19 102/66      Pulse Ox Temp Source Heart Rate Source Patient Position   100 % Temporal Monitor --      BP Location FiO2 (%)     -- --       Physical Exam    GENERAL: Well nourished and well developed. Answers questions appropriately.    SKIN: Clean and dry without evidence of rashes.    HEENT: Skull normocephalic, atraumatic. No scleral icterus. PERRLA, with EOMI.  Mucous membranes  moist and pink in color.     RESPIRATORY: Clear to ausculation with normal effort. No adventitious sounds, intercostal retractions or shallow breathing.    CARDIOVASCULAR: Regular rate and rhythm with normal S1, S2. No S3, S4, murmurs or gallops. Capillary refill brisk, less than 3 seconds bilaterally. No unilateral lower extremity edema.    ABD/: Diffuse tenderness to the lower abdomen on palpation.  Abdomen is soft.    MSK: Spontaneously moves all 4 extremities without difficulty.  No injury or obvious deformity.      NEURO/PSYCH: A&Ox3. Cranial nerves II-XII grossly intact. No focal motor or sensory deficits. Appropriate mood and behavior.    ED Course & MDM   Diagnoses as of 24 1614   Miscarriage       Medical Decision Making  Parts of this chart have been completed using voice recognition software. Please excuse any errors of transcription. Despite the medical decision making time stamp above-my medical decision making has taken place during the patient's entire visit. My thought process and reason for plan has been formulated from the time that I saw the patient until the time of disposition and is not specific to one specific moment during their visit and furthermore my MDM encompasses this entire chart and not only this text box.      HPI: Detailed above.    Exam: A medically appropriate exam performed, outlined above, given the known history and presentation.    History obtained from: patient    Social Determinants of Health considered during this visit: age, homeless    Labs Reviewed - No data to display  No orders to display     Medications - No data to display    Differential diagnoses considered for this visit include: Miscarriage versus spontaneous     Considerations/further MDM:    Patient is a 38-year-old female presenting for evaluation of miscarriage.  On arrival, there was fetal tissue available for examination.  Fetus was inside the gestational sac with the placental tissue  attached.  My attending physician immediately spoke to OB/GYN Dr. Shelton, who agreed to take the patient for further assessment on labor and delivery unit.  No testing was done here in the emergency department.    Patient was seen in conjunction with attending physician Dr. Simón Lundy   Patient's history, physical exam, diagnostic studies, and treatment plan were discussed thoroughly.    Procedure  Procedures     Jade De La Garza PA-C  04/07/24 5905

## 2024-04-08 VITALS
BODY MASS INDEX: 19.99 KG/M2 | HEART RATE: 78 BPM | WEIGHT: 120 LBS | TEMPERATURE: 97.9 F | RESPIRATION RATE: 18 BRPM | OXYGEN SATURATION: 99 % | SYSTOLIC BLOOD PRESSURE: 124 MMHG | DIASTOLIC BLOOD PRESSURE: 87 MMHG | HEIGHT: 65 IN

## 2024-04-08 LAB
ERYTHROCYTE [DISTWIDTH] IN BLOOD BY AUTOMATED COUNT: 12.6 % (ref 11.5–14.5)
HBV SURFACE AG SERPL QL IA: NONREACTIVE
HCT VFR BLD AUTO: 32.8 % (ref 36–46)
HCV AB SER QL: NONREACTIVE
HGB BLD-MCNC: 11.1 G/DL (ref 12–16)
MCH RBC QN AUTO: 29.7 PG (ref 26–34)
MCHC RBC AUTO-ENTMCNC: 33.8 G/DL (ref 32–36)
MCV RBC AUTO: 88 FL (ref 80–100)
NRBC BLD-RTO: 0 /100 WBCS (ref 0–0)
PLATELET # BLD AUTO: 362 X10*3/UL (ref 150–450)
RBC # BLD AUTO: 3.74 X10*6/UL (ref 4–5.2)
TREPONEMA PALLIDUM IGG+IGM AB [PRESENCE] IN SERUM OR PLASMA BY IMMUNOASSAY: NONREACTIVE
WBC # BLD AUTO: 20.2 X10*3/UL (ref 4.4–11.3)

## 2024-04-08 PROCEDURE — 85027 COMPLETE CBC AUTOMATED: CPT | Performed by: STUDENT IN AN ORGANIZED HEALTH CARE EDUCATION/TRAINING PROGRAM

## 2024-04-08 PROCEDURE — 36415 COLL VENOUS BLD VENIPUNCTURE: CPT | Performed by: STUDENT IN AN ORGANIZED HEALTH CARE EDUCATION/TRAINING PROGRAM

## 2024-04-08 PROCEDURE — G0378 HOSPITAL OBSERVATION PER HR: HCPCS

## 2024-04-08 PROCEDURE — 96365 THER/PROPH/DIAG IV INF INIT: CPT | Mod: 59

## 2024-04-08 PROCEDURE — 2500000004 HC RX 250 GENERAL PHARMACY W/ HCPCS (ALT 636 FOR OP/ED): Performed by: STUDENT IN AN ORGANIZED HEALTH CARE EDUCATION/TRAINING PROGRAM

## 2024-04-08 PROCEDURE — 1220000001 HC OB SEMI-PRIVATE ROOM DAILY

## 2024-04-08 RX ORDER — CARBOPROST TROMETHAMINE 250 UG/ML
250 INJECTION, SOLUTION INTRAMUSCULAR ONCE AS NEEDED
Status: DISCONTINUED | OUTPATIENT
Start: 2024-04-07 | End: 2024-04-08 | Stop reason: HOSPADM

## 2024-04-08 RX ORDER — METOCLOPRAMIDE HYDROCHLORIDE 5 MG/ML
10 INJECTION INTRAMUSCULAR; INTRAVENOUS EVERY 6 HOURS PRN
Status: DISCONTINUED | OUTPATIENT
Start: 2024-04-07 | End: 2024-04-08 | Stop reason: HOSPADM

## 2024-04-08 RX ORDER — POLYETHYLENE GLYCOL 3350 17 G/17G
17 POWDER, FOR SOLUTION ORAL 2 TIMES DAILY PRN
Status: DISCONTINUED | OUTPATIENT
Start: 2024-04-07 | End: 2024-04-08 | Stop reason: HOSPADM

## 2024-04-08 RX ORDER — MISOPROSTOL 200 UG/1
800 TABLET ORAL ONCE AS NEEDED
Status: DISCONTINUED | OUTPATIENT
Start: 2024-04-07 | End: 2024-04-08 | Stop reason: HOSPADM

## 2024-04-08 RX ORDER — OXYTOCIN 10 [USP'U]/ML
10 INJECTION, SOLUTION INTRAMUSCULAR; INTRAVENOUS ONCE AS NEEDED
Status: DISCONTINUED | OUTPATIENT
Start: 2024-04-07 | End: 2024-04-08 | Stop reason: HOSPADM

## 2024-04-08 RX ORDER — METHYLERGONOVINE MALEATE 0.2 MG/ML
0.2 INJECTION INTRAVENOUS ONCE AS NEEDED
Status: DISCONTINUED | OUTPATIENT
Start: 2024-04-07 | End: 2024-04-08 | Stop reason: HOSPADM

## 2024-04-08 RX ORDER — BISACODYL 10 MG/1
10 SUPPOSITORY RECTAL DAILY PRN
Status: DISCONTINUED | OUTPATIENT
Start: 2024-04-07 | End: 2024-04-08 | Stop reason: HOSPADM

## 2024-04-08 RX ORDER — ADHESIVE BANDAGE
30 BANDAGE TOPICAL
Status: DISCONTINUED | OUTPATIENT
Start: 2024-04-07 | End: 2024-04-08 | Stop reason: HOSPADM

## 2024-04-08 RX ORDER — ONDANSETRON HYDROCHLORIDE 2 MG/ML
4 INJECTION, SOLUTION INTRAVENOUS EVERY 6 HOURS PRN
Status: DISCONTINUED | OUTPATIENT
Start: 2024-04-07 | End: 2024-04-08 | Stop reason: HOSPADM

## 2024-04-08 RX ORDER — NIFEDIPINE 10 MG/1
10 CAPSULE ORAL ONCE AS NEEDED
Status: DISCONTINUED | OUTPATIENT
Start: 2024-04-07 | End: 2024-04-08 | Stop reason: HOSPADM

## 2024-04-08 RX ORDER — ONDANSETRON 4 MG/1
4 TABLET, FILM COATED ORAL EVERY 6 HOURS PRN
Status: DISCONTINUED | OUTPATIENT
Start: 2024-04-07 | End: 2024-04-08 | Stop reason: HOSPADM

## 2024-04-08 RX ORDER — TRANEXAMIC ACID 100 MG/ML
1000 INJECTION, SOLUTION INTRAVENOUS ONCE AS NEEDED
Status: DISCONTINUED | OUTPATIENT
Start: 2024-04-07 | End: 2024-04-08 | Stop reason: HOSPADM

## 2024-04-08 RX ORDER — METOCLOPRAMIDE 10 MG/1
10 TABLET ORAL EVERY 6 HOURS PRN
Status: DISCONTINUED | OUTPATIENT
Start: 2024-04-07 | End: 2024-04-08 | Stop reason: HOSPADM

## 2024-04-08 RX ORDER — HYDRALAZINE HYDROCHLORIDE 20 MG/ML
5 INJECTION INTRAMUSCULAR; INTRAVENOUS ONCE AS NEEDED
Status: DISCONTINUED | OUTPATIENT
Start: 2024-04-07 | End: 2024-04-08 | Stop reason: HOSPADM

## 2024-04-08 RX ORDER — LABETALOL HYDROCHLORIDE 5 MG/ML
20 INJECTION, SOLUTION INTRAVENOUS ONCE AS NEEDED
Status: DISCONTINUED | OUTPATIENT
Start: 2024-04-07 | End: 2024-04-08 | Stop reason: HOSPADM

## 2024-04-08 RX ORDER — SODIUM CHLORIDE, SODIUM LACTATE, POTASSIUM CHLORIDE, CALCIUM CHLORIDE 600; 310; 30; 20 MG/100ML; MG/100ML; MG/100ML; MG/100ML
125 INJECTION, SOLUTION INTRAVENOUS CONTINUOUS
Status: DISCONTINUED | OUTPATIENT
Start: 2024-04-08 | End: 2024-04-08 | Stop reason: HOSPADM

## 2024-04-08 RX ORDER — SIMETHICONE 80 MG
80 TABLET,CHEWABLE ORAL 4 TIMES DAILY PRN
Status: DISCONTINUED | OUTPATIENT
Start: 2024-04-07 | End: 2024-04-08 | Stop reason: HOSPADM

## 2024-04-08 RX ORDER — LOPERAMIDE HYDROCHLORIDE 2 MG/1
4 CAPSULE ORAL EVERY 2 HOUR PRN
Status: DISCONTINUED | OUTPATIENT
Start: 2024-04-07 | End: 2024-04-08 | Stop reason: HOSPADM

## 2024-04-08 RX ADMIN — AMPICILLIN AND SULBACTAM 3 G: 2; 1 INJECTION, POWDER, FOR SOLUTION INTRAVENOUS at 05:55

## 2024-04-08 RX ADMIN — AMPICILLIN AND SULBACTAM 3 G: 2; 1 INJECTION, POWDER, FOR SOLUTION INTRAVENOUS at 00:02

## 2024-04-08 SDOH — SOCIAL STABILITY: SOCIAL INSECURITY: DOES ANYONE TRY TO KEEP YOU FROM HAVING/CONTACTING OTHER FRIENDS OR DOING THINGS OUTSIDE YOUR HOME?: NO

## 2024-04-08 SDOH — HEALTH STABILITY: MENTAL HEALTH: SUICIDAL BEHAVIOR (LIFETIME): NO

## 2024-04-08 SDOH — SOCIAL STABILITY: SOCIAL INSECURITY: PHYSICAL ABUSE: YES, PRESENT (COMMENT)

## 2024-04-08 SDOH — SOCIAL STABILITY: SOCIAL INSECURITY: DO YOU FEEL ANYONE HAS EXPLOITED OR TAKEN ADVANTAGE OF YOU FINANCIALLY OR OF YOUR PERSONAL PROPERTY?: YES

## 2024-04-08 SDOH — HEALTH STABILITY: MENTAL HEALTH: HAVE YOU USED ANY SUBSTANCES (CANABIS, COCAINE, HEROIN, HALLUCINOGENS, INHALANTS, ETC.) IN THE PAST 12 MONTHS?: YES

## 2024-04-08 SDOH — ECONOMIC STABILITY: HOUSING INSECURITY: DO YOU FEEL UNSAFE GOING BACK TO THE PLACE WHERE YOU ARE LIVING?: YES

## 2024-04-08 SDOH — HEALTH STABILITY: MENTAL HEALTH: WERE YOU ABLE TO COMPLETE ALL THE BEHAVIORAL HEALTH SCREENINGS?: YES

## 2024-04-08 SDOH — SOCIAL STABILITY: SOCIAL INSECURITY: ABUSE SCREEN: ADULT

## 2024-04-08 SDOH — SOCIAL STABILITY: SOCIAL INSECURITY: ARE THERE ANY APPARENT SIGNS OF INJURIES/BEHAVIORS THAT COULD BE RELATED TO ABUSE/NEGLECT?: YES

## 2024-04-08 SDOH — HEALTH STABILITY: MENTAL HEALTH: HAVE YOU USED ANY PRESCRIPTION DRUGS OTHER THAN PRESCRIBED IN THE PAST 12 MONTHS?: YES

## 2024-04-08 SDOH — SOCIAL STABILITY: SOCIAL INSECURITY: HAS ANYONE EVER THREATENED TO HURT YOUR FAMILY OR YOUR PETS?: NO

## 2024-04-08 SDOH — SOCIAL STABILITY: SOCIAL INSECURITY: HAVE YOU HAD THOUGHTS OF HARMING ANYONE ELSE?: NO

## 2024-04-08 SDOH — HEALTH STABILITY: MENTAL HEALTH

## 2024-04-08 SDOH — HEALTH STABILITY: MENTAL HEALTH: NON-SPECIFIC ACTIVE SUICIDAL THOUGHTS (PAST 1 MONTH): NO

## 2024-04-08 SDOH — HEALTH STABILITY: MENTAL HEALTH: EXPERIENCED ANY OF THE FOLLOWING LIFE EVENTS: SEXUAL ASSAULT;PHYSICAL ASSAULT

## 2024-04-08 SDOH — HEALTH STABILITY: MENTAL HEALTH: ACTIVE SUICIDAL IDEATION WITH SOME INTENT TO ACT, WITHOUT SPECIFIC PLAN (PAST 1 MONTH): NO

## 2024-04-08 SDOH — SOCIAL STABILITY: SOCIAL INSECURITY: VERBAL ABUSE: YES, PRESENT (COMMENT)

## 2024-04-08 SDOH — HEALTH STABILITY: MENTAL HEALTH: ACTIVE SUICIDAL IDEATION WITH SPECIFIC PLAN AND INTENT (PAST 1 MONTH): NO

## 2024-04-08 SDOH — HEALTH STABILITY: MENTAL HEALTH: WISH TO BE DEAD (PAST 1 MONTH): NO

## 2024-04-08 SDOH — SOCIAL STABILITY: SOCIAL INSECURITY: POSSIBLE ABUSE REPORTED TO:: SOCIAL SERVICES;COUNTY SOCIAL SERVICES

## 2024-04-08 SDOH — HEALTH STABILITY: MENTAL HEALTH: EXPERIENCED ANY OF THE FOLLOWING LIFE EVENTS: OTHER (COMMENT)

## 2024-04-08 SDOH — SOCIAL STABILITY: SOCIAL INSECURITY: ARE YOU OR HAVE YOU BEEN THREATENED OR ABUSED PHYSICALLY, EMOTIONALLY, OR SEXUALLY BY ANYONE?: YES

## 2024-04-08 ASSESSMENT — PATIENT HEALTH QUESTIONNAIRE - PHQ9
SUM OF ALL RESPONSES TO PHQ9 QUESTIONS 1 & 2: 6
2. FEELING DOWN, DEPRESSED OR HOPELESS: NEARLY EVERY DAY
SUM OF ALL RESPONSES TO PHQ9 QUESTIONS 1 & 2: 6
1. LITTLE INTEREST OR PLEASURE IN DOING THINGS: NEARLY EVERY DAY
2. FEELING DOWN, DEPRESSED OR HOPELESS: NEARLY EVERY DAY
1. LITTLE INTEREST OR PLEASURE IN DOING THINGS: NEARLY EVERY DAY

## 2024-04-08 ASSESSMENT — LIFESTYLE VARIABLES
AUDIT-C TOTAL SCORE: -1
AUDIT-C TOTAL SCORE: -1
SKIP TO QUESTIONS 9-10: 0
AUDIT-C TOTAL SCORE: -1
HOW OFTEN DO YOU HAVE 6 OR MORE DRINKS ON ONE OCCASION: PATIENT DECLINED
HOW OFTEN DO YOU HAVE 6 OR MORE DRINKS ON ONE OCCASION: PATIENT DECLINED
HOW MANY STANDARD DRINKS CONTAINING ALCOHOL DO YOU HAVE ON A TYPICAL DAY: PATIENT DECLINED
SKIP TO QUESTIONS 9-10: 0
HOW OFTEN DO YOU HAVE A DRINK CONTAINING ALCOHOL: PATIENT DECLINED
AUDIT-C TOTAL SCORE: -1
HOW MANY STANDARD DRINKS CONTAINING ALCOHOL DO YOU HAVE ON A TYPICAL DAY: PATIENT DECLINED
HOW OFTEN DO YOU HAVE A DRINK CONTAINING ALCOHOL: PATIENT DECLINED

## 2024-04-08 ASSESSMENT — PAIN SCALES - GENERAL
PAINLEVEL_OUTOF10: 0 - NO PAIN
PAINLEVEL_OUTOF10: 0 - NO PAIN

## 2024-04-08 ASSESSMENT — ACTIVITIES OF DAILY LIVING (ADL): LACK_OF_TRANSPORTATION: YES

## 2024-04-08 NOTE — SIGNIFICANT EVENT
Notified that RN presented to room to find that patient was not in there. She was not found on unit but was seen through the window smoking outside and returning to unit. Upon return she finished her call with social work re: disposition of baby, then requested IV removal and discharge. She was again informed she would be leaving against medical advice and signed AMA form confirming she understood risk of PPH, maternal sepsis and subsequent morbidity and mortality. She then left unit under her own power.

## 2024-04-08 NOTE — SIGNIFICANT EVENT
Fetus taken to [facility] at 1120.  Accompanied by DERREK Sanabria RN and VIN Rodriguez RN.   Chuck Carroll met at [facility] to receive fetus.  Verified toe tag and bands with security.  Fetus placed inside [facility] drawer by this RN.
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
## 2024-04-08 NOTE — SIGNIFICANT EVENT
Pt verbalizes she often has nothing to eat for days and eats out of dumpsters or other that others in the house she is staying at eat her food.

## 2024-04-08 NOTE — NURSING NOTE
"Fetus remain in cuddle cot and back to patient's room per patient's request. Pt Verbalized, \"I am naming him Poncho Watters\" Pt agree to new IV placement, labs and antibiotics.   "

## 2024-04-08 NOTE — NURSING NOTE
Pt verbalized concerns for next step of what to do with fetus. Verbalized concern for the fact she has no money to pay for  or cremation by is interested in possible cremation. Social work consult in place, and will discuss with social work to as how to assist patient with where to release infant to for possible cremation.

## 2024-04-08 NOTE — SIGNIFICANT EVENT
SW notified RN that she was mid telephone conversation with patient and phone went dead.  RN to room, patient noted to not be in room or on unit.  Second RN to bedside to stay with infant.  This RN noted patient outside from window to be smoking.

## 2024-04-08 NOTE — NURSING NOTE
Rn took rhogam shot to administer to patient, pt declined rhogam. Dr Arrieta aware. Rhogam sent back to pharmacy at this time.

## 2024-04-08 NOTE — SIGNIFICANT EVENT
Patient back to room.  RN discussed with patient the need to stay on the unit with IV in place.  Can not leave infant in room alone.  Patient states that she was frustrated by questions from social work and that she couldn't handle it.  Needed to have a cigarette.  Patient states that she does want to leave and does not want to wait until repeat labs and antibiotics.  Advised patient that she needs to at least finish conversation with social work in attempt to find  home.  RN stayed at bedside until RAMIRO phoned back.

## 2024-04-08 NOTE — SIGNIFICANT EVENT
"RN bedside.  Patient off phone with SW.  Patient states she discussed  home options and arrangements with SW but declines all offers.  Patient states that \"she will never be able to return for obtain remains, so why bother\".  RN attempted to discuss options with patient but patient becoming agitated, stating she just wants to leave.  IV removed at this time.  Advised patient to allow me to get AMA form and disposition together for her to sign.  Patient agrees.    "

## 2024-04-08 NOTE — NURSING NOTE
"Pt signed AMA form at this time, fetus remains in cuddle cot at bedside at this time. Pt has gown on and jacket, cigarettes and light, left back pack behind at this time in room. Pt verbalize she \"will be back after she smokes\"   Fetus remains in cuddle cot and taken to nursery at this time while patient is off division.   "

## 2024-04-08 NOTE — PROGRESS NOTES
Postpartum Progress Note    Assessment/Plan   Darcy Godoy is a 38 y.o., , who delivered at Unknown gestation and is now postpartum day 1.    PPD#1 s/p  of fetal demise at unknown GA with concern for chorioamnionitis. Patient is clinically improving, plan for repeat CBC and next dose of abx at 1200. PNL from admission still pending. SW consulted,  calling into room to speak with patient at this time.       Principal Problem:    Encounter for postpartum care after unplanned out of hospital delivery    Pregnancy Problems (from 24 to present)       Problem Noted Resolved    Fetal demise in locke pregnancy greater than 22 weeks gestation, antepartum 2024 by Mary Shelton MD No    Priority:  Medium              Subjective   Abdominal pain improving. Mild lochia. Tolerating PO. Ambulating. Denies fever/chills.       Objective   Allergies:   Sulfa (sulfonamide antibiotics)         Last Vitals:  Temp Pulse Resp BP MAP Pulse Ox   36.6 °C (97.9 °F) 78 18 124/87   99 %     Vitals Min/Max Last 24 Hours:  Temp  Min: 36.5 °C (97.7 °F)  Max: 37 °C (98.6 °F)  Pulse  Min: 53  Max: 92  Resp  Min: 14  Max: 19  BP  Min: 92/53  Max: 124/74    Physical Exam:  General: Examination reveals a thin, cachetic female, in no acute distress. She is alert and cooperative.  Lungs: clear to auscultation bilaterally.  Cardiac: regular rate and rhythm, S1, S2 normal, no murmur, click, rub or gallop.  Abdomen: soft, fundus firm, mildly TTP, no rebound/guarding/rigidity.  Extremities: no redness or tenderness in the calves or thighs, no edema.  Neurological: DTRs normal and symmetrical.  Psychological: awake and alert; oriented to person, place, and time.    Lab Data:  Lab Results   Component Value Date    WBC 20.2 (H) 2024    HGB 11.1 (L) 2024    HCT 32.8 (L) 2024     2024

## 2024-04-08 NOTE — NURSING NOTE
"Patient signed AMA form.  Advised patient of need monitor for s/s of infection or hemorrhage.  Discussed with patient need to go to nearest emergency room if occurs.  Patient states \"she will not do that and I will deal.\"  Patient also signed hospital disposition for fetus.  Patient states she will not be able to  remains.  Offered patient memorial service offered by hospital but patient declines.  Attempted to provided resources for patient but not accepting.  Patient asked if I could contact [facility] to see if they would give her a ride.    "

## 2024-04-08 NOTE — DISCHARGE SUMMARY
Discharge Summary    Admission Date: 4/7/2024  Discharge Date: none -- patient left AGAINST MEDICAL ADVICE    Discharge Diagnosis  Fetal demise in locke pregnancy greater than 22 weeks gestation, antepartum    Hospital Course  Delivery Date: 4/7/2024  3:19 PM   Delivery type: Vaginal, Spontaneous  prior to hospital arrival  GA at delivery: Unknown  Outcome: Fetal Demise   Procedures:  examination under anesthesia    Patient presented to the ED via EMS, in which she had precipitously delivered a fetus of unknown gestational age with no signs of life. She underwent postpartum examination under anesthesia due on ongoing postpartum bleeding and not tolerating examination. She was started on IV antibiotics due to concern for chorioamnionitis based on admission examination. Approximately 7 hours postpartum, she requested to leave the floor to smoke and signed out against medical advice when this request was denied.       Test Results Pending At Discharge  Pending Labs       Order Current Status    Surgical Pathology Exam - PLACENTA Collected (04/07/24 9134)    Hepatitis B Surface Antigen In process    Hepatitis C Antibody In process    Prenatal Screening Panel In process    Syphillis Screen, with reflex VDRL In process              Mary Shelton MD

## 2024-04-08 NOTE — PROGRESS NOTES
To patient bedside to assess patient and review events in OR. Patient is having some cramping but otherwise feeling well. Bleeding has been scant, fundus is firm. VSS. Discussed plan for overnight observation for abx and observation of postpartum bleeding, with plans for social work eval in the AM. Rhogam for Rh neg.

## 2024-04-08 NOTE — NURSING NOTE
Pt resting, awakens with RN entering room for assessment and vital signs. Bleeding remains scant with fundus remaining firm an about 4 below umbilicus and midline, pt denies needing to use restroom at this time, and denies pain. Fetus remains at bedside in cuddle cot per pt's request. Pt noted to make eye contact and reach down into cuddle cot to touch fetus, pt noted to be tearful at times. Staff continues to offer support to patient. Pt back to sleep with no difficulty post assessment.

## 2024-04-08 NOTE — SIGNIFICANT EVENT
"[facility] unable to pick patient up due to end location in [place].  Discussed with patient ride options and states she has no one.  States \"I just want to leave and I have walked further\".  Attempted to calm patient and speak rationally to her but patient is not having it or listening. Patient gathered belongings and left unit AMA.    "

## 2024-04-08 NOTE — NURSING NOTE
Pt remains with fetus at bedside in cuddle cot, held for a while and now placed in the cuddle cot at bedside. Pt calm and and expresses thankfulness for all that staff has done for and Pon[place].
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
## 2024-04-08 NOTE — NURSING NOTE
Nurse to nurse hand off done at bedside, per pt's request she would like to hold  fetus at this time. Fetus  brought to room by rn in cuddle cot and placed at pt's bedside. Fetus placed in mother's arm by rn.

## 2024-04-08 NOTE — DISCHARGE SUMMARY
Discharge Summary    Admission Date: 4/7/2024  Discharge Date: none -- patient left AGAINST MEDICAL ADVICE    Discharge Diagnosis  Encounter for postpartum care after unplanned out of hospital delivery    Hospital Course  Delivery Date: 4/7/2024  3:19 PM   Delivery type: Vaginal, Spontaneous  prior to hospital arrival  GA at delivery: Unknown  Outcome: Fetal Demise   Procedures:  examination under anesthesia    Patient signed out AMA last night after precipitous delivery of fetal demise. During that admission there was concern for intrauterine infection so patient was started on antibiotics. After she went to smoke, she returned to unit and was readmitted. She received 2 doses of antibiotics and then again requested discharge. She signed out against medical advice when this request was denied.       Mary Shelton MD

## 2024-04-08 NOTE — SIGNIFICANT EVENT
Called to patient bedside for patient requesting to leave the unit to smoke. Patient was told that she would not be able to leave the unit as she has an IV and is recently postpartum. She is demanding IV be removed so she can go smoke. Patient already been given a nicotine patch. She was offered anxiolytic and declined. She was informed that if she left the unit it would be against medical advice, due to risk of maternal hemorrhage, maternal sepsis, and possibly maternal death. Patient is adamant that she is leaving. IV was removed, patient signed AMA form and left unit under her own power.

## 2024-04-08 NOTE — NURSING NOTE
Pt declines pain medications, resting. Awakens with RN at bedside, would like fetus to remain at the bedside in cuddle cot. Cuddle cot and cooling, fetus in cuddle cot.
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
## 2024-04-08 NOTE — CARE PLAN
The patient's goals for the shift include      The clinical goals for the shift include Pt's bleeding will remain stable    Problem: Postpartum  Goal: No s/sx infection  Outcome: Progressing  Goal: No s/sx of hemorrhage  Outcome: Progressing  Goal: Minimal s/sx of HDP and BP<160/110  Outcome: Progressing     Problem:  Loss  Goal: Appropriate  loss/grief for pt/family  Outcome: Progressing  Goal: Demonstrated coping  Outcome: Progressing

## 2024-04-08 NOTE — NURSING NOTE
"Pt called out, rn at bedside. Patient sitting up in bed and picking IV, verbalized she pulled off the nicotine patch and would like to go and smoke. RN discussed with pt risks of leaving post hemorrhage. Pt verbalized \"I know and I don't really care, I need a to go and smoke\" Dr Shelton at bedside at this time to discuss with patient the risks of leaving. Educated pt on importance of antibiotics. Pt verbalized she just wanted to go smoke. Dr Shelton discussed with patient she could not be discharged at this time to smoke, and discussed with patient that she if she wanted to smoke she would have to sign out against medical advice. Pt would like to leave at time to smoke, and would to have IV removed. I V removed at this time per Dr Shelton.   " Yes - the patient is able to be screened

## 2024-04-12 LAB
LABORATORY COMMENT REPORT: NORMAL
PATH REPORT.FINAL DX SPEC: NORMAL
PATH REPORT.GROSS SPEC: NORMAL
PATH REPORT.RELEVANT HX SPEC: NORMAL
PATH REPORT.TOTAL CANCER: NORMAL

## 2024-11-03 ENCOUNTER — HOSPITAL ENCOUNTER (EMERGENCY)
Facility: HOSPITAL | Age: 39
Discharge: OTHER INSTITUTION | End: 2024-11-03

## 2024-11-03 VITALS
DIASTOLIC BLOOD PRESSURE: 76 MMHG | HEIGHT: 68 IN | HEART RATE: 112 BPM | OXYGEN SATURATION: 100 % | RESPIRATION RATE: 35 BRPM | SYSTOLIC BLOOD PRESSURE: 192 MMHG | TEMPERATURE: 97.7 F | BODY MASS INDEX: 18.19 KG/M2 | WEIGHT: 120 LBS

## 2024-11-03 DIAGNOSIS — Z59.00 HOMELESSNESS: ICD-10-CM

## 2024-11-03 DIAGNOSIS — R07.9 ACUTE CHEST PAIN: Primary | ICD-10-CM

## 2024-11-03 PROCEDURE — 99283 EMERGENCY DEPT VISIT LOW MDM: CPT

## 2024-11-03 PROCEDURE — 99281 EMR DPT VST MAYX REQ PHY/QHP: CPT

## 2024-11-03 SDOH — ECONOMIC STABILITY - HOUSING INSECURITY: HOMELESSNESS UNSPECIFIED: Z59.00

## 2024-11-03 ASSESSMENT — PAIN DESCRIPTION - PAIN TYPE: TYPE: ACUTE PAIN

## 2024-11-03 ASSESSMENT — PAIN DESCRIPTION - LOCATION: LOCATION: CHEST

## 2024-11-03 ASSESSMENT — PAIN - FUNCTIONAL ASSESSMENT: PAIN_FUNCTIONAL_ASSESSMENT: 0-10

## 2024-11-03 ASSESSMENT — PAIN SCALES - GENERAL: PAINLEVEL_OUTOF10: 3

## 2024-11-03 ASSESSMENT — PAIN DESCRIPTION - DESCRIPTORS: DESCRIPTORS: SORE
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
## 2024-11-06 ENCOUNTER — HOSPITAL ENCOUNTER (OUTPATIENT)
Dept: CARDIOLOGY | Facility: HOSPITAL | Age: 39
Discharge: HOME | End: 2024-11-06

## 2024-11-06 LAB
ATRIAL RATE: 66 BPM
P AXIS: 72 DEGREES
P OFFSET: 201 MS
P ONSET: 152 MS
PR INTERVAL: 138 MS
Q ONSET: 221 MS
QRS COUNT: 11 BEATS
QRS DURATION: 90 MS
QT INTERVAL: 404 MS
QTC CALCULATION(BAZETT): 423 MS
QTC FREDERICIA: 417 MS
R AXIS: 53 DEGREES
T AXIS: 54 DEGREES
T OFFSET: 423 MS
VENTRICULAR RATE: 66 BPM

## 2024-11-06 PROCEDURE — 93005 ELECTROCARDIOGRAM TRACING: CPT
